# Patient Record
Sex: FEMALE | Race: WHITE
[De-identification: names, ages, dates, MRNs, and addresses within clinical notes are randomized per-mention and may not be internally consistent; named-entity substitution may affect disease eponyms.]

---

## 2019-01-18 ENCOUNTER — HOSPITAL ENCOUNTER (INPATIENT)
Dept: HOSPITAL 46 - FBC | Age: 34
LOS: 1 days | Discharge: HOME | End: 2019-01-19
Attending: OBSTETRICS & GYNECOLOGY | Admitting: OBSTETRICS & GYNECOLOGY
Payer: COMMERCIAL

## 2019-01-18 VITALS — WEIGHT: 189.6 LBS | BODY MASS INDEX: 31.59 KG/M2 | HEIGHT: 65 IN

## 2019-01-18 DIAGNOSIS — Z86.19: ICD-10-CM

## 2019-01-18 DIAGNOSIS — Z88.8: ICD-10-CM

## 2019-01-18 DIAGNOSIS — Z3A.40: ICD-10-CM

## 2019-01-18 DIAGNOSIS — E02: ICD-10-CM

## 2019-01-18 DIAGNOSIS — Z79.899: ICD-10-CM

## 2019-01-18 PROCEDURE — 0HQ9XZZ REPAIR PERINEUM SKIN, EXTERNAL APPROACH: ICD-10-PCS | Performed by: OBSTETRICS & GYNECOLOGY

## 2019-01-18 PROCEDURE — 00HU33Z INSERTION OF INFUSION DEVICE INTO SPINAL CANAL, PERCUTANEOUS APPROACH: ICD-10-PCS | Performed by: NURSE ANESTHETIST, CERTIFIED REGISTERED

## 2019-01-18 PROCEDURE — 3E0R3BZ INTRODUCTION OF ANESTHETIC AGENT INTO SPINAL CANAL, PERCUTANEOUS APPROACH: ICD-10-PCS | Performed by: NURSE ANESTHETIST, CERTIFIED REGISTERED

## 2019-01-18 NOTE — PR
[facility]
                                    [address] [facility]
                                  [place], [place]  [address]
_________________________________________________________________________________________
                                                                 Signed   
 
 
===================================
Progress Notes IP
===================================
Datetime Report Generated by CPN: 01/18/2019 08:21
   
PROGRESS NOTES:  [id number]
Impression:  Normal progression of labor; Reassuring fetal heart rate
Plan:  Continue present management; Anesthesia consult
VITAL SIGNS:  [id number]
Vital Signs:  Reviewed; Within Normal Limits
EXAM:  [id number]
Dilatation:  4.0
Effacement:  80
Station:  -2
Uterine Contractions:  q 3-4 mintes
MEMBRANES:  [id number]
Pooling:  Positive
Membrane Status:  Ruptured
Amniotic Fluid Color:  Clear
ROM Note:  Pelvic exam per RN
Comments:  Pt comfortable w/ epidural.  FHR reassuring.  Continue expectant management. 
Fetus A:  [id number]
FHR Baseline:  140
Variability:  Moderate 6-25bpm
Accelerations:  15X15
Decelerations:  None
FHR Category:  Category I
Presentation:  Vertex
Comments on Fetus A:  No evidence of fetal metabolic acidosis
Fetus B:  [id number]
Signing Physician:  Susanne Rocha DO
 
 
Copies:                                
~
 
 
 
 
 
 
 
 
*Electronically Signed*  01/18/19  0821  SUSANNE ROCHA DO               
                                                                       
_________________________________________________________________________________________
PATIENT NAME:     LORA PINTO       
MEDICAL RECORD #: [id number]                     PROGRESS NOTE                 
          ACCT #: [id number]  
DATE OF BIRTH:   [birth year]                                       
PHYSICIAN:   SUSANNE ROCHA DO                      RPT #: [id number]
REPORT IS CONFIDENTIAL AND NOT TO BE RELEASED WITHOUT AUTHORIZATION

## 2019-01-18 NOTE — PR
[facility]
                                    [address] [place], [place]  [address]
_________________________________________________________________________________________
                                                                 Signed   
 
 
===================================
Progress Notes IP
===================================
Datetime Report Generated by CPN: 01/18/2019 13:02
   
PROGRESS NOTES:  [id number]
Impression:  Normal progression of labor; Reassuring fetal heart rate
Plan:  Continue present management; Augmentation
Informed Consent Obtain:  Vaginal Delivery
VITAL SIGNS:  [id number]
Vital Signs:  Reviewed; Within Normal Limits
EXAM:  [id number]
Dilatation:  6.5
Effacement:  85
Station:  -2
Uterine Contractions:  q2-4 minutes
MEMBRANES:  [id number]
Pooling:  Positive
Membrane Status:  Ruptured
Amniotic Fluid Color:  Clear
ROM Note:  Pelvic exam per RN
Comments:  Pt seen and examined.  Doing well.  Comfortable w/ CTXs.  Pitocin @ 1.3mU. 
   Occasional variables with moderate variability, quick return to baseline, and accels. 
   Reviewed variable decels w/ pt and family.  All questions answered.  Continue pitocin
   augmentation
Fetus A:  [id number]
FHR Baseline:  140
Variability:  Moderate 6-25bpm
Accelerations:  15X15
Decelerations:  Variable
FHR Category:  Category II
Presentation:  Vertex
Comments on Fetus A:  No evidence of fetal metabolic acidosis
Fetus B:  [id number]
Signing Physician:  Susanne Rocha DO
 
 
Copies:                                
~
 
 
 
 
*Electronically Signed*  01/18/19  1300  SUSANNE ROCHA DO               
                                                                       
_________________________________________________________________________________________
PATIENT NAME:     MERCEDEZ CLARKLORA       
MEDICAL RECORD #: [id number]                     PROGRESS NOTE                 
          ACCT #: [id number]  
DATE OF BIRTH:   [birth year]                                       
PHYSICIAN:   SUSANNE ROCHA DO                      RPT #: [id number]
REPORT IS CONFIDENTIAL AND NOT TO BE RELEASED WITHOUT AUTHORIZATION

## 2019-01-18 NOTE — PR
[facility]
                                    [address] [place], [place]  [address]
_________________________________________________________________________________________
                                                                 Signed   
 
 
===================================
Progress Notes IP
===================================
Datetime Report Generated by CPN: 01/18/2019 10:40
   
PROGRESS NOTES:  [id number]
Impression:  Normal progression of labor; Reassuring fetal heart rate
Plan:  Continue present management
VITAL SIGNS:  [id number]
Vital Signs:  Reviewed; Within Normal Limits
EXAM:  [id number]
Dilatation:  5.0
Effacement:  80
Station:  -2
Uterine Contractions:  q4-7 minutes
MEMBRANES:  [id number]
Pooling:  Positive
Membrane Status:  Ruptured
Amniotic Fluid Color:  Clear
ROM Note:  Pelvic exam per RN
Comments:  Pt seen and evaluated.  Doing well.  Comfortable w/ epidural.  Reviewed course
   of labor.  Will check cervix in _30 minutes; if no significant cervical change, will
   consider augmentation w/ pitocin. Pt understands and agrees.
Fetus A:  [id number]
FHR Baseline:  130
Variability:  Moderate 6-25bpm
Accelerations:  15X15
Decelerations:  None
FHR Category:  Category I
Presentation:  Vertex
Comments on Fetus A:  No evidence of fetal metabolic acidosis
Fetus B:  [id number]
Signing Physician:  Susanne Rocha DO
 
 
Copies:                                
~
 
 
 
 
 
 
*Electronically Signed*  01/18/19  1040  SUSANNE ROCHA DO               
                                                                       
_________________________________________________________________________________________
PATIENT NAME:     MERCEDEZ CLARKLORAARET       
MEDICAL RECORD #: [id number]                     PROGRESS NOTE                 
          ACCT #: [id number]  
DATE OF BIRTH:   [birth year]                                       
PHYSICIAN:   SUSANNE ROCHA DO                      RPT #: [id number]
REPORT IS CONFIDENTIAL AND NOT TO BE RELEASED WITHOUT AUTHORIZATION

## 2019-01-19 NOTE — PR
[facility]
                                    [address] [facility]
                                  [place], [place]  [address]
_________________________________________________________________________________________
                                                                 Signed   
 
 
===================================
PP Progress Notes
===================================
Datetime Report Generated by CPN: 2019 09:45
   
SUBJECTIVE:  [id number]
Pain:  Within normal limits
Nausea/Vomiting:  Denies
Flatus:  Yes
Bowel Movement:  Yes
Vital Signs:  [id number]
Vital Signs:  Reviewed; Within Normal Limits
POSTPARTUM EXAM:  [id number]
Cardiovascular:  Normal
Respiratory:  Normal
Abdomen/Uterus:  Normal
Lochia:  Not Done
Vulva/Perineum:  Not Done
Breasts:  Not Done
CVA Tenderness:  Normal
Extremities:  Normal
 Incision:  Not Applicable
Breastfeeding Progress:  Not Applicable
Exam Comments:  Fundus firm U-2 nontender
IMPRESSION/PLAN/PROCEDURES:  [id number]
Impression:  Normal postpartum progression
Plan:  Discharge
Progress Notes:  Pt seen and examined.  Doing well.  Ambulating, voiding, and tolerating
   full diet.  Pain and lochia minimal.  Not breastfeeding.  No fevers / chills / other
   complaints.  Desires d/c home today.  Reviewed pp care and discharge instructions in
   detail.  Planning OCPs @ 6 wks pp.  No other questions or concerns.
Signing Physician:  Susanne Rocha DO
 
 
Copies:                                
~
 
 
 
 
 
 
 
*Electronically Signed*  19  0945  SUSANNE ROCHA DO               
                                                                       
_________________________________________________________________________________________
PATIENT NAME:     MERCEDEZ CLARKLORA AFSHAN       
MEDICAL RECORD #: [id number]                     PROGRESS NOTE                 
          ACCT #: [id number]  
DATE OF BIRTH:   [birth year]                                       
PHYSICIAN:   SUSANNE ROCHA DO                      RPT #: [id number]
REPORT IS CONFIDENTIAL AND NOT TO BE RELEASED WITHOUT AUTHORIZATION